# Patient Record
Sex: MALE
[De-identification: names, ages, dates, MRNs, and addresses within clinical notes are randomized per-mention and may not be internally consistent; named-entity substitution may affect disease eponyms.]

---

## 2020-04-19 ENCOUNTER — HOSPITAL ENCOUNTER (EMERGENCY)
Dept: HOSPITAL 56 - MW.ED | Age: 25
Discharge: HOME | End: 2020-04-19
Payer: SELF-PAY

## 2020-04-19 DIAGNOSIS — R11.10: Primary | ICD-10-CM

## 2020-04-19 LAB
BUN SERPL-MCNC: 11 MG/DL (ref 7–18)
CHLORIDE SERPL-SCNC: 100 MMOL/L (ref 98–107)
CO2 SERPL-SCNC: 28.3 MMOL/L (ref 21–32)
GLUCOSE SERPL-MCNC: 145 MG/DL (ref 74–106)
LIPASE SERPL-CCNC: 61 U/L (ref 73–393)
POTASSIUM SERPL-SCNC: 3.1 MMOL/L (ref 3.5–5.1)
SODIUM SERPL-SCNC: 139 MMOL/L (ref 136–148)

## 2020-04-19 PROCEDURE — 85379 FIBRIN DEGRADATION QUANT: CPT

## 2020-04-19 PROCEDURE — 85025 COMPLETE CBC W/AUTO DIFF WBC: CPT

## 2020-04-19 PROCEDURE — 96375 TX/PRO/DX INJ NEW DRUG ADDON: CPT

## 2020-04-19 PROCEDURE — 83690 ASSAY OF LIPASE: CPT

## 2020-04-19 PROCEDURE — 96374 THER/PROPH/DIAG INJ IV PUSH: CPT

## 2020-04-19 PROCEDURE — 96361 HYDRATE IV INFUSION ADD-ON: CPT

## 2020-04-19 PROCEDURE — 93005 ELECTROCARDIOGRAM TRACING: CPT

## 2020-04-19 PROCEDURE — 71045 X-RAY EXAM CHEST 1 VIEW: CPT

## 2020-04-19 PROCEDURE — 84484 ASSAY OF TROPONIN QUANT: CPT

## 2020-04-19 PROCEDURE — 36415 COLL VENOUS BLD VENIPUNCTURE: CPT

## 2020-04-19 PROCEDURE — 80053 COMPREHEN METABOLIC PANEL: CPT

## 2020-04-19 PROCEDURE — 99285 EMERGENCY DEPT VISIT HI MDM: CPT

## 2020-04-19 NOTE — EDM.PDOC
ED HPI GENERAL MEDICAL PROBLEM





- General


Chief Complaint: Abdominal Pain


Stated Complaint: NAUSEA


Time Seen by Provider: 04/19/20 19:25


Source of Information: Reports: Patient


History Limitations: Reports: No Limitations





- History of Present Illness


INITIAL COMMENTS - FREE TEXT/NARRATIVE: 


 service used:  ID [id number]





Patient is a 24-year-old male with no significant past medical history 

presenting with chief complaint of vomiting, shortness of breath, and cramping 

in his hands which started shortly after drinking a frappe.  Patient denies any 

tightness or itchiness in his throat.  Patient denies any itchiness to his 

skin.  Patient denies any prior history of similar symptoms.  Nothing makes 

symptoms better or worse.  Patient denies any pain anywhere including headache, 

chest pain, abdominal pain.  All day today, the patient has been feeling well 

and not experiencing any sort of symptoms.  Patient has no recent travels. 





Pmhx: None


Pshx: None


Family Hx: noncontributory 





Smoking history? no 


Etoh use? none


Drug use? none





In addition to that documented in the HPI above, the additional ROS was obtained

:


Constitutional: Denies fevers or chills


Eyes: Denies vision changes


ENMT: Denies sore throat


CV: Denies chest pain


Resp: Per HPI


GI: Per HPI


: Denies painful urination


MSK: Denies recent trauma


Skin: Denies new rashes


Neuro: Denies new numbness or tingling or weakness


Endocrine: Denies unexpected weight loss


Heme: Denies bleeding disorders





I have reviewed the triage vital signs


Const: Well nourished, well developed, appears stated age


Eyes: PERRL, no conjunctival injection


HENT: NCAT, Neck supple without meningismus 


CV: RRR, Warm, well-perfused extremities


RESP: CTAB, Unlabored respiratory effort


GI: soft, non-tender, non-distended, no masses


MSK: No gross deformities appreciated


Skin: Warm, dry. No rashes


Neuro: Alert, CNs II-XII grossly intact. Sensation and motor function of 

extremities grossly intact.


Psych: Appropriate mood and affect





Assessment and plan:


Patient is a 24-year-old male no significant past medical history presenting 

with vomiting and possible anxiety.  Patient's EKG demonstrated initially sinus 

tachycardia which improved on repeat examination.  Patient's repeat EKG 

demonstrates normal sinus rhythm without ischemic changes.  Patient's labs were 

negative for any evidence of acute coronary syndrome or pulmonary embolism.  

Patient's checks x-ray was within normal limits and did not demonstrate any 

signs of pneumothorax.  Patient feels better after administration of 

medications.  Patient given return precautions for chest pain and vomiting.  

All questions addressed and answered.  Patient agrees with plan.








- Related Data


 Allergies











Allergy/AdvReac Type Severity Reaction Status Date / Time


 


No Known Allergies Allergy   Verified 04/19/20 19:33











Home Meds: 


 Home Meds





. [No Known Home Meds]  04/19/20 [History]











ED ROS GENERAL





- Review of Systems


Review Of Systems: See Below





ED EXAM, GI/ABD





- Physical Exam


Exam: See Below





Course





- Vital Signs


Last Recorded V/S: 


 Last Vital Signs











Temp  36.4 C   04/19/20 19:09


 


Pulse  98   04/19/20 20:15


 


Resp  17   04/19/20 20:15


 


BP  128/84   04/19/20 20:15


 


Pulse Ox  98   04/19/20 20:15














- Orders/Labs/Meds


Orders: 


 Active Orders 24 hr











 Category Date Time Status


 


 EKG Documentation Completion [RC] STAT Care  04/19/20 20:19 Active











Labs: 


 Laboratory Tests











  04/19/20 04/19/20 04/19/20 Range/Units





  19:24 19:24 19:24 


 


WBC  9.49    (4.0-11.0)  K/uL


 


RBC  5.52    (4.50-5.90)  M/uL


 


Hgb  16.5    (13.0-17.0)  g/dL


 


Hct  48.1    (38.0-50.0)  %


 


MCV  87.1    (80.0-98.0)  fL


 


MCH  29.9    (27.0-32.0)  pg


 


MCHC  34.3    (31.0-37.0)  g/dL


 


RDW Std Deviation  42.9    (28.0-62.0)  fl


 


RDW Coeff of Scottie  13    (11.0-15.0)  %


 


Plt Count  221    (150-400)  K/uL


 


MPV  11.60    (7.40-12.00)  fL


 


Neut % (Auto)  66.0    (48.0-80.0)  %


 


Lymph % (Auto)  28.0    (16.0-40.0)  %


 


Mono % (Auto)  5.5    (0.0-15.0)  %


 


Eos % (Auto)  0.4    (0.0-7.0)  %


 


Baso % (Auto)  0.1    (0.0-1.5)  %


 


Neut # (Auto)  6.3 H    (1.4-5.7)  K/uL


 


Lymph # (Auto)  2.7 H    (0.6-2.4)  K/uL


 


Mono # (Auto)  0.5    (0.0-0.8)  K/uL


 


Eos # (Auto)  0.0    (0.0-0.7)  K/uL


 


Baso # (Auto)  0.0    (0.0-0.1)  K/uL


 


Nucleated RBC %  0.0    /100WBC


 


Nucleated RBCs #  0    K/uL


 


D-Dimer, Quantitative   < 0.19   (0.0-0.50)  mg/L FEU


 


Sodium    139  (136-148)  mmol/L


 


Potassium    3.1 L  (3.5-5.1)  mmol/L


 


Chloride    100  ()  mmol/L


 


Carbon Dioxide    28.3  (21.0-32.0)  mmol/L


 


BUN    11  (7.0-18.0)  mg/dL


 


Creatinine    1.1  (0.8-1.3)  mg/dL


 


Est Cr Clr Drug Dosing    TNP  


 


Estimated GFR (MDRD)    > 60.0  ml/min


 


Glucose    145 H  ()  mg/dL


 


Calcium    8.7  (8.5-10.1)  mg/dL


 


Total Bilirubin    0.5  (0.2-1.0)  mg/dL


 


AST    29  (15-37)  IU/L


 


ALT    72 H  (14-63)  IU/L


 


Alkaline Phosphatase    112  ()  U/L


 


Troponin I    < 0.050  (0.000-0.056)  ng/mL


 


Total Protein    8.7 H  (6.4-8.2)  g/dL


 


Albumin    4.5  (3.4-5.0)  g/dL


 


Globulin    4.2 H  (2.6-4.0)  g/dL


 


Albumin/Globulin Ratio    1.1  (0.9-1.6)  


 


Lipase    61 L  ()  U/L











Meds: 


Medications














Discontinued Medications














Generic Name Dose Route Start Last Admin





  Trade Name Freq  PRN Reason Stop Dose Admin


 


Diphenhydramine HCl  25 mg  04/19/20 19:22  04/19/20 19:38





  Benadryl  IVPUSH  04/19/20 19:23  25 mg





  ONETIME ONE   Administration





     





     





     





     


 


Famotidine  20 mg  04/19/20 19:24  04/19/20 19:39





  Pepcid  IVPUSH  04/19/20 19:25  20 mg





  ONETIME ONE   Administration





     





     





     





     


 


Sodium Chloride  1,000 mls @ 1,000 mls/hr  04/19/20 19:22  04/19/20 19:34





  Normal Saline  IV  04/19/20 20:21  1,000 mls/hr





  .Bolus ONE   Administration





     





     





     





     


 


Ondansetron HCl  4 mg  04/19/20 19:24  04/19/20 19:36





  Zofran  IVPUSH  04/19/20 19:25  4 mg





  ONETIME ONE   Administration





     





     





     





     


 


Potassium Chloride  40 meq  04/19/20 19:57  04/19/20 20:35





  Klor-Con M20  PO  04/19/20 19:58  40 meq





  ONETIME ONE   Administration





     





     





     





     














Departure





- Departure


Time of Disposition: 20:18


Disposition: Home, Self-Care 01


Clinical Impression: 


 Vomiting








- Discharge Information


Instructions:  Nausea and Vomiting, Adult, Easy-to-Read


Referrals: 


Karyn Wolfe,Farida [Ordering Only Provider] - 


PCP,None [Primary Care Provider] - 


Forms:  ED Department Discharge


Additional Instructions: 


The following information is given to patients seen in the emergency department 

who are being discharged to home. This information is to outline your options 

for follow-up care. We provide all patients seen in our emergency department 

with a follow-up referral.





The need for follow-up, as well as the timing and circumstances, are variable 

depending upon the specifics of your emergency department visit.





If you don't have a primary care physician on staff, we will provide you with a 

referral. We always advise you to contact your personal physician following an 

emergency department visit to inform them of the circumstance of the visit and 

for follow-up with them and/or the need for any referrals to a consulting 

specialist.





The emergency department will also refer you to a specialist when appropriate. 

This referral assures that you have the opportunity for follow-up care with a 

specialist. All of these measure are taken in an effort to provide you with 

optimal care, which includes your follow-up.





Under all circumstances we always encourage you to contact your private 

physician who remains a resource for coordinating your care. When calling for 

follow-up care, please make the office aware that this follow-up is from your 

recent emergency room visit. If for any reason you are refused follow-up, 

please contact the [facility] Emergency 

Department at [phone] and asked to speak to the emergency department 

charge nurse.








Sepsis Event Note





- Focused Exam


Vital Signs: 


 Vital Signs











  Temp Pulse Resp BP Pulse Ox


 


 04/19/20 20:15   98  17  128/84  98


 


 04/19/20 20:00   102 H  18  117/78  97


 


 04/19/20 19:45   109 H  16  133/86  98


 


 04/19/20 19:09  36.4 C  125 H  20  151/67 H  97











Date Exam was Performed: 04/19/20


Time Exam was Performed: 20:43





- My Orders


Last 24 Hours: 


My Active Orders





04/19/20 20:19


EKG Documentation Completion [RC] STAT 














- Assessment/Plan


Last 24 Hours: 


My Active Orders





04/19/20 20:19


EKG Documentation Completion [RC] STAT

## 2020-04-19 NOTE — CR
Chest: AP portable view of the chest was obtained.

 

Comparison: No previous chest imaging.

 

Heart size and mediastinum are normal.  Lungs are clear with no acute 

parenchymal change.  Bony structures are grossly intact.

 

Impression:

1.  Nothing acute is seen on portable chest x-ray.

 

Diagnostic code #1

 

This report was dictated in MDT